# Patient Record
(demographics unavailable — no encounter records)

---

## 2024-10-11 NOTE — RESULTS/DATA
[FreeTextEntry1] : 8/3/21: 1, 2, 3. 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 Suggest correlatio with clinical and other laboratory findings for definitive classification.\par  Pending special stains (reticulin and trichrom)\par  \par  Ancillary studies\par  Special stains: Bone marrow aspirate iron stain: Iron stores are decreased; no ring sideroblasts are seen.\par  Flow cytometry of bone marrow aspirate shows normal myeloid granularity, no increase in myeloid immaturity, and normal\par  myeloid antigen maturation pattern, and the presence of hematogones.\par  \par  Microscopic description:\par  1. Biopsy: Sections of clot and biopsy show hypercellularity (50 to 70%) with mild erythroid predominance, myeloid and erythroid\par  maturation, mild megakaryocytosis with unremarkable morphology (focal clustering), one lymphoid aggregate, and decreased iron stores.\par  \par  \par  \par

## 2024-10-11 NOTE — ASSESSMENT
[With Patient/Caregiver] : With Patient/Caregiver [FreeTextEntry1] : Primary Polycythemia JAK2+/ V 617 mutation HIgh risk given age > 60  -CT Abdomen pelvis 7/7/21 : Liver spleen WNL No RP mesenteric or pelvic lymphadenopathy. Lower lung 2-3 mm nodule - Epo level WNL (ho low end of normal) 10/14/22: WBC 13.0, HGB 15.0, HCT 49.4  11/9/22 WBC 6.3, Hg 13.1, HCT 44.4, plt 581 12/21/22; Hb 14.1, HCt 46 Plt 493 WBC 6.7 2/7/23 Hb 13.5, HCt 44.4 Plt 505 WBC 6.9 3/17/2023 Fe 19, TIBC 383, FeSat 5%, B12 315, FA 8, Ferritin 8 04/05/2023 Hb 13.6 , HCT 45.0,  , WBC 6.0, Cr 1.07 5/1/23 WBC 6.2 Hb 13.9, HCt 45.1 Plt 464 9/13/2023  Fe 14 , TIBC 392 , FeSat 4%, Ferritin 10 11/20/2023: WBC 7.1 Hb 13.8, HCt 44.5 Plt 608 05/06/2024: WBC 7.7 Hb 13.6, HCT 44.3%,   - no Hydrea at this time - Mild elevated PLT count likely 2/2 Iron deficiency Ferritin 8 on 11/20/23 8 on 3/17/23, from 10 on 10/14/22. PLT today at 591 - Continue B12 2000 IU supplement -Mild leukocytosis on 10/14/22 WBC at 13, likely from recent COVID infection. 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 The histological findings in conjunction with a positive JAK2 p.Xpf477Sar mutation (11% VAF) are consistent with myeloproliferative neoplasm. - No symptoms of fatigues/ pruritus, splenomegaly, headaches, lightheadedness, visual disturbances - Continue baby ASA 81 mg daily - Last Phlebotomies: 1/5/23, 6/12/23, 9/20/23, 1/3/24.  s/p phlebotomy on 6/3/24, 8/15/24  Goal for HCT to be <45. HCT 45.6 today, will schedule another phleb now  - elevation of Plt count, potentially 2/2 Iron def anemia Howver also potenital of 2/2 JAk2  Continue to monitor   F/U in 2 months .      [AdvancecareDate] : 07/29/24

## 2024-10-11 NOTE — HISTORY OF PRESENT ILLNESS
[de-identified] :  SHARLENE PAIZ is a 71 year old M with a past medical h/o HLD, BPH was referred to our office per his PMD for polycythemia with Hb of 18.1 / MCV 57.9% with routine labs done at PMD on 4/29/21 \par  \par   Patient was scheduled to have prostate laser and went to PMD for medical clearance\par  \par  Patient shows me lab from prior \par  \par  5/8/20 Hb 17,7, HCt 53.9 plt 296 ANC 3.4 \par  11/5/2020: Hb 18 HCT 53.1 \par  4/28/21: Hb 18.5, HCT 57.9% \par  \par  JAK2 mutation +\par  No mutation was detected in the MPL gene.\par  No insertion or deletion was observed in exon 9 of the calreticulin gene (CALR)\par  \par  Completed COVID19 vaccine series and received booster.  [de-identified] : Patient here for follow up, s/p phlebotomy on 6/3/24, 8/15/24  Patient doing well, offers no acute complaints Denies fever, night sweats, unexplained weight loss, blurry vision, fatigues/ pruritus, splenomegaly, headaches, lightheadedness.

## 2024-10-11 NOTE — HISTORY OF PRESENT ILLNESS
[de-identified] :  SHARLENE PAIZ is a 71 year old M with a past medical h/o HLD, BPH was referred to our office per his PMD for polycythemia with Hb of 18.1 / MCV 57.9% with routine labs done at PMD on 4/29/21 \par  \par   Patient was scheduled to have prostate laser and went to PMD for medical clearance\par  \par  Patient shows me lab from prior \par  \par  5/8/20 Hb 17,7, HCt 53.9 plt 296 ANC 3.4 \par  11/5/2020: Hb 18 HCT 53.1 \par  4/28/21: Hb 18.5, HCT 57.9% \par  \par  JAK2 mutation +\par  No mutation was detected in the MPL gene.\par  No insertion or deletion was observed in exon 9 of the calreticulin gene (CALR)\par  \par  Completed COVID19 vaccine series and received booster.  [de-identified] : Patient here for follow up, s/p phlebotomy on 6/3/24, 8/15/24  Patient doing well, offers no acute complaints Denies fever, night sweats, unexplained weight loss, blurry vision, fatigues/ pruritus, splenomegaly, headaches, lightheadedness.

## 2024-10-11 NOTE — RESULTS/DATA
[FreeTextEntry1] : 8/3/21: 1, 2, 3. 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 Suggest correlatio with clinical and other laboratory findings for definitive classification.\par  Pending special stains (reticulin and trichrom)\par  \par  Ancillary studies\par  Special stains: Bone marrow aspirate iron stain: Iron stores are decreased; no ring sideroblasts are seen.\par  Flow cytometry of bone marrow aspirate shows normal myeloid granularity, no increase in myeloid immaturity, and normal\par  myeloid antigen maturation pattern, and the presence of hematogones.\par  \par  Microscopic description:\par  1. Biopsy: Sections of clot and biopsy show hypercellularity (50 to 70%) with mild erythroid predominance, myeloid and erythroid\par  maturation, mild megakaryocytosis with unremarkable morphology (focal clustering), one lymphoid aggregate, and decreased iron stores.\par  \par  \par  \par

## 2024-10-11 NOTE — ASSESSMENT
[With Patient/Caregiver] : With Patient/Caregiver [FreeTextEntry1] : Primary Polycythemia JAK2+/ V 617 mutation HIgh risk given age > 60  -CT Abdomen pelvis 7/7/21 : Liver spleen WNL No RP mesenteric or pelvic lymphadenopathy. Lower lung 2-3 mm nodule - Epo level WNL (ho low end of normal) 10/14/22: WBC 13.0, HGB 15.0, HCT 49.4  11/9/22 WBC 6.3, Hg 13.1, HCT 44.4, plt 581 12/21/22; Hb 14.1, HCt 46 Plt 493 WBC 6.7 2/7/23 Hb 13.5, HCt 44.4 Plt 505 WBC 6.9 3/17/2023 Fe 19, TIBC 383, FeSat 5%, B12 315, FA 8, Ferritin 8 04/05/2023 Hb 13.6 , HCT 45.0,  , WBC 6.0, Cr 1.07 5/1/23 WBC 6.2 Hb 13.9, HCt 45.1 Plt 464 9/13/2023  Fe 14 , TIBC 392 , FeSat 4%, Ferritin 10 11/20/2023: WBC 7.1 Hb 13.8, HCt 44.5 Plt 608 05/06/2024: WBC 7.7 Hb 13.6, HCT 44.3%,   - no Hydrea at this time - Mild elevated PLT count likely 2/2 Iron deficiency Ferritin 8 on 11/20/23 8 on 3/17/23, from 10 on 10/14/22. PLT today at 591 - Continue B12 2000 IU supplement -Mild leukocytosis on 10/14/22 WBC at 13, likely from recent COVID infection. 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 The histological findings in conjunction with a positive JAK2 p.Cpu460Oiq mutation (11% VAF) are consistent with myeloproliferative neoplasm. - No symptoms of fatigues/ pruritus, splenomegaly, headaches, lightheadedness, visual disturbances - Continue baby ASA 81 mg daily - Last Phlebotomies: 1/5/23, 6/12/23, 9/20/23, 1/3/24.  s/p phlebotomy on 6/3/24, 8/15/24  Goal for HCT to be <45. HCT 45.6 today, will schedule another phleb now  - elevation of Plt count, potentially 2/2 Iron def anemia Howver also potenital of 2/2 JAk2  Continue to monitor   F/U in 2 months .      [AdvancecareDate] : 07/29/24

## 2024-10-11 NOTE — BEGINNING OF VISIT
[PHQ-2 Negative] : PHQ-2 Negative [PHQ-9 Deferred] : PHQ-9 Deferred [Advised Primary Care Follow-up] : Advised Primary Care Follow-up  [Pain Scale: ___] : On a scale of 1-10, today the patient's pain is a(n) [unfilled]. [Never] : Never [Date Discussed (MM/DD/YY): ___] : Discussed: [unfilled] [Abdominal Pain] : no abdominal pain [Vomiting] : no vomiting [Constipation] : no constipation

## 2024-12-10 NOTE — PHYSICAL EXAM
[Normal] : normal rate, regular rhythm, normal S1 and S2 and no murmur heard [No Edema] : there was no peripheral edema [Soft] : abdomen soft [Non Tender] : non-tender [No Rash] : no rash [Normal Gait] : normal gait [Normal Affect] : the affect was normal [Normal Mood] : the mood was normal [Non-distended] : non-distended [Normal Bowel Sounds] : normal bowel sounds [de-identified] : friendly

## 2024-12-10 NOTE — PLAN
[FreeTextEntry1] : HLD - LDL well controlled, continue Simvastatin.  Polycythemia vera - follow up with hematology tomorrow. Last phlebotomy in 8/24. Continue Aspirin 81 mg. Flu shot given today.   RTO 6 months for physical.

## 2024-12-10 NOTE — HISTORY OF PRESENT ILLNESS
[FreeTextEntry1] : HLD, polycythemia vera [de-identified] : Patient comes for routine 6 month visit and fasting labs.  Feels well today, has no complaints. He developed left knee pain 8/24, saw ortho Dr. Medrano and received cortisone shot. Pain now resolved.  Fasting labs performed last week. Scheduled to see hematology tomorrow. Last phlebotomy was in 8/24. He would like flu shot.

## 2024-12-13 NOTE — RESULTS/DATA
[FreeTextEntry1] : 8/3/21: 1, 2, 3. Bone marrow biopsy and clot and bone marrow aspirate\par  - JAK2-positive myeloid neoplasm with decreased iron stores\par  \par  Diagnostic note:\par  The histological findings in conjunction with a positive JAK2 p.[id number] mutation (11% VAF) are consistent with\par  myeloproliferative neoplasm. Suggest correlatio with clinical and other laboratory findings for definitive classification.\par  Pending special stains (reticulin and trichrom)\par  \par  Ancillary studies\par  Special stains: Bone marrow aspirate iron stain: Iron stores are decreased; no ring sideroblasts are seen.\par  Flow cytometry of bone marrow aspirate shows normal myeloid granularity, no increase in myeloid immaturity, and normal\par  myeloid antigen maturation pattern, and the presence of hematogones.\par  \par  Microscopic description:\par  1. Biopsy: Sections of clot and biopsy show hypercellularity (50 to 70%) with mild erythroid predominance, myeloid and erythroid\par  maturation, mild megakaryocytosis with unremarkable morphology (focal clustering), one lymphoid aggregate, and decreased iron stores.\par  \par  \par  \par

## 2024-12-13 NOTE — HISTORY OF PRESENT ILLNESS
[de-identified] :  SHARLENE PAIZ is a 71 year old M with a past medical h/o HLD, BPH was referred to our office per his PMD for polycythemia with Hb of 18.1 / MCV 57.9% with routine labs done at PMD on 4/29/21 \par  \par   Patient was scheduled to have prostate laser and went to PMD for medical clearance\par  \par  Patient shows me lab from prior \par  \par  5/8/20 Hb 17,7, HCt 53.9 plt 296 ANC 3.4 \par  11/5/2020: Hb 18 HCT 53.1 \par  4/28/21: Hb 18.5, HCT 57.9% \par  \par  JAK2 mutation +\par  No mutation was detected in the MPL gene.\par  No insertion or deletion was observed in exon 9 of the calreticulin gene (CALR)\par  \par  Completed COVID19 vaccine series and received booster.  [de-identified] : Patient here for follow up, s/p phlebotomy on 6/3/24, 8/15/24,  Scheduled phleb on 11/11/24, held due to low Hg 12.2/ HCT 40.9  Patient doing well, offers no acute complaints Denies fever, night sweats, unexplained weight loss, blurry vision, fatigues/ pruritus, splenomegaly, headaches, lightheadedness. Today's CBC: WBC 7.5, Hg 12.2 , HCT 43 , plt 558

## 2024-12-13 NOTE — RESULTS/DATA
[FreeTextEntry1] : 8/3/21: 1, 2, 3. Bone marrow biopsy and clot and bone marrow aspirate\par  - JAK2-positive myeloid neoplasm with decreased iron stores\par  \par  Diagnostic note:\par  The histological findings in conjunction with a positive JAK2 p.Ina804Hhl mutation (11% VAF) are consistent with\par  myeloproliferative neoplasm. Suggest correlatio with clinical and other laboratory findings for definitive classification.\par  Pending special stains (reticulin and trichrom)\par  \par  Ancillary studies\par  Special stains: Bone marrow aspirate iron stain: Iron stores are decreased; no ring sideroblasts are seen.\par  Flow cytometry of bone marrow aspirate shows normal myeloid granularity, no increase in myeloid immaturity, and normal\par  myeloid antigen maturation pattern, and the presence of hematogones.\par  \par  Microscopic description:\par  1. Biopsy: Sections of clot and biopsy show hypercellularity (50 to 70%) with mild erythroid predominance, myeloid and erythroid\par  maturation, mild megakaryocytosis with unremarkable morphology (focal clustering), one lymphoid aggregate, and decreased iron stores.\par  \par  \par  \par

## 2024-12-13 NOTE — ASSESSMENT
[With Patient/Caregiver] : With Patient/Caregiver [FreeTextEntry1] : Primary Polycythemia JAK2+/ V 617 mutation HIgh risk given age > 60  -CT Abdomen pelvis 7/7/21 : Liver spleen WNL No RP mesenteric or pelvic lymphadenopathy. Lower lung 2-3 mm nodule - Epo level WNL (ho low end of normal) 10/14/22: WBC 13.0, HGB 15.0, HCT 49.4  11/9/22 WBC 6.3, Hg 13.1, HCT 44.4, plt 581 12/21/22; Hb 14.1, HCt 46 Plt 493 WBC 6.7 2/7/23 Hb 13.5, HCt 44.4 Plt 505 WBC 6.9 3/17/2023 Fe 19, TIBC 383, FeSat 5%, B12 315, FA 8, Ferritin 8 04/05/2023 Hb 13.6 , HCT 45.0,  , WBC 6.0, Cr 1.07 5/1/23 WBC 6.2 Hb 13.9, HCt 45.1 Plt 464 9/13/2023  Fe 14 , TIBC 392 , FeSat 4%, Ferritin 10 11/20/2023: WBC 7.1 Hb 13.8, HCt 44.5 Plt 608 05/06/2024: WBC 7.7 Hb 13.6, HCT 44.3%,   - no Hydrea at this time - Mild elevated PLT count likely 2/2 Iron deficiency Ferritin 8 on 11/20/23 8 on 3/17/23, from 10 on 10/14/22. PLT today at 591 - Continue B12 2000 IU supplement -Mild leukocytosis on 10/14/22 WBC at 13, likely from recent COVID infection. 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 The histological findings in conjunction with a positive JAK2 p.[id number] mutation (11% VAF) are consistent with myeloproliferative neoplasm. - No symptoms of fatigues/ pruritus, splenomegaly, headaches, lightheadedness, visual disturbances - Continue baby ASA 81 mg daily - Last Phlebotomies: 1/5/23, 6/12/23, 9/20/23, 1/3/24.  s/p phlebotomy on 6/3/24, 8/15/24  Goal for HCT to be <45.  -HCT 45.6 10/11/2024,scheduled phleb on 11/11/24, held due to low Hg 12.2/ HCT 40.9  - elevation of Plt count, potentially 2/2 Iron def anemia However also potential of 2/2 JAk2   - Lab only apt in 6 weeks - F/U in 3 months      [AdvancecareDate] : 07/29/24

## 2024-12-13 NOTE — ASSESSMENT
[With Patient/Caregiver] : With Patient/Caregiver [FreeTextEntry1] : Primary Polycythemia JAK2+/ V 617 mutation HIgh risk given age > 60  -CT Abdomen pelvis 7/7/21 : Liver spleen WNL No RP mesenteric or pelvic lymphadenopathy. Lower lung 2-3 mm nodule - Epo level WNL (ho low end of normal) 10/14/22: WBC 13.0, HGB 15.0, HCT 49.4  11/9/22 WBC 6.3, Hg 13.1, HCT 44.4, plt 581 12/21/22; Hb 14.1, HCt 46 Plt 493 WBC 6.7 2/7/23 Hb 13.5, HCt 44.4 Plt 505 WBC 6.9 3/17/2023 Fe 19, TIBC 383, FeSat 5%, B12 315, FA 8, Ferritin 8 04/05/2023 Hb 13.6 , HCT 45.0,  , WBC 6.0, Cr 1.07 5/1/23 WBC 6.2 Hb 13.9, HCt 45.1 Plt 464 9/13/2023  Fe 14 , TIBC 392 , FeSat 4%, Ferritin 10 11/20/2023: WBC 7.1 Hb 13.8, HCt 44.5 Plt 608 05/06/2024: WBC 7.7 Hb 13.6, HCT 44.3%,   - no Hydrea at this time - Mild elevated PLT count likely 2/2 Iron deficiency Ferritin 8 on 11/20/23 8 on 3/17/23, from 10 on 10/14/22. PLT today at 591 - Continue B12 2000 IU supplement -Mild leukocytosis on 10/14/22 WBC at 13, likely from recent COVID infection. 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 The histological findings in conjunction with a positive JAK2 p.Jgg581Nsg mutation (11% VAF) are consistent with myeloproliferative neoplasm. - No symptoms of fatigues/ pruritus, splenomegaly, headaches, lightheadedness, visual disturbances - Continue baby ASA 81 mg daily - Last Phlebotomies: 1/5/23, 6/12/23, 9/20/23, 1/3/24.  s/p phlebotomy on 6/3/24, 8/15/24  Goal for HCT to be <45.  -HCT 45.6 10/11/2024,scheduled phleb on 11/11/24, held due to low Hg 12.2/ HCT 40.9  - elevation of Plt count, potentially 2/2 Iron def anemia However also potential of 2/2 JAk2   - Lab only apt in 6 weeks - F/U in 3 months      [AdvancecareDate] : 07/29/24

## 2024-12-13 NOTE — HISTORY OF PRESENT ILLNESS
[de-identified] :  SHARLENE PAIZ is a 71 year old M with a past medical h/o HLD, BPH was referred to our office per his PMD for polycythemia with Hb of 18.1 / MCV 57.9% with routine labs done at PMD on 4/29/21 \par  \par   Patient was scheduled to have prostate laser and went to PMD for medical clearance\par  \par  Patient shows me lab from prior \par  \par  5/8/20 Hb 17,7, HCt 53.9 plt 296 ANC 3.4 \par  11/5/2020: Hb 18 HCT 53.1 \par  4/28/21: Hb 18.5, HCT 57.9% \par  \par  JAK2 mutation +\par  No mutation was detected in the MPL gene.\par  No insertion or deletion was observed in exon 9 of the calreticulin gene (CALR)\par  \par  Completed COVID19 vaccine series and received booster.  [de-identified] : Patient here for follow up, s/p phlebotomy on 6/3/24, 8/15/24,  Scheduled phleb on 11/11/24, held due to low Hg 12.2/ HCT 40.9  Patient doing well, offers no acute complaints Denies fever, night sweats, unexplained weight loss, blurry vision, fatigues/ pruritus, splenomegaly, headaches, lightheadedness. Today's CBC: WBC 7.5, Hg 12.2 , HCT 43 , plt 558

## 2024-12-13 NOTE — RESULTS/DATA
[FreeTextEntry1] : 8/3/21: 1, 2, 3. Bone marrow biopsy and clot and bone marrow aspirate\par  - JAK2-positive myeloid neoplasm with decreased iron stores\par  \par  Diagnostic note:\par  The histological findings in conjunction with a positive JAK2 p.Uqa542Fnf mutation (11% VAF) are consistent with\par  myeloproliferative neoplasm. Suggest correlatio with clinical and other laboratory findings for definitive classification.\par  Pending special stains (reticulin and trichrom)\par  \par  Ancillary studies\par  Special stains: Bone marrow aspirate iron stain: Iron stores are decreased; no ring sideroblasts are seen.\par  Flow cytometry of bone marrow aspirate shows normal myeloid granularity, no increase in myeloid immaturity, and normal\par  myeloid antigen maturation pattern, and the presence of hematogones.\par  \par  Microscopic description:\par  1. Biopsy: Sections of clot and biopsy show hypercellularity (50 to 70%) with mild erythroid predominance, myeloid and erythroid\par  maturation, mild megakaryocytosis with unremarkable morphology (focal clustering), one lymphoid aggregate, and decreased iron stores.\par  \par  \par  \par   0

## 2024-12-13 NOTE — HISTORY OF PRESENT ILLNESS
[de-identified] :  SHARLENE PAIZ is a 71 year old M with a past medical h/o HLD, BPH was referred to our office per his PMD for polycythemia with Hb of 18.1 / MCV 57.9% with routine labs done at PMD on 4/29/21 \par  \par   Patient was scheduled to have prostate laser and went to PMD for medical clearance\par  \par  Patient shows me lab from prior \par  \par  5/8/20 Hb 17,7, HCt 53.9 plt 296 ANC 3.4 \par  11/5/2020: Hb 18 HCT 53.1 \par  4/28/21: Hb 18.5, HCT 57.9% \par  \par  JAK2 mutation +\par  No mutation was detected in the MPL gene.\par  No insertion or deletion was observed in exon 9 of the calreticulin gene (CALR)\par  \par  Completed COVID19 vaccine series and received booster.  [de-identified] : Patient here for follow up, s/p phlebotomy on 6/3/24, 8/15/24,  Scheduled phleb on 11/11/24, held due to low Hg 12.2/ HCT 40.9  Patient doing well, offers no acute complaints Denies fever, night sweats, unexplained weight loss, blurry vision, fatigues/ pruritus, splenomegaly, headaches, lightheadedness. Today's CBC: WBC 7.5, Hg 12.2 , HCT 43 , plt 558

## 2024-12-13 NOTE — ASSESSMENT
[With Patient/Caregiver] : With Patient/Caregiver [FreeTextEntry1] : Primary Polycythemia JAK2+/ V 617 mutation HIgh risk given age > 60  -CT Abdomen pelvis 7/7/21 : Liver spleen WNL No RP mesenteric or pelvic lymphadenopathy. Lower lung 2-3 mm nodule - Epo level WNL (ho low end of normal) 10/14/22: WBC 13.0, HGB 15.0, HCT 49.4  11/9/22 WBC 6.3, Hg 13.1, HCT 44.4, plt 581 12/21/22; Hb 14.1, HCt 46 Plt 493 WBC 6.7 2/7/23 Hb 13.5, HCt 44.4 Plt 505 WBC 6.9 3/17/2023 Fe 19, TIBC 383, FeSat 5%, B12 315, FA 8, Ferritin 8 04/05/2023 Hb 13.6 , HCT 45.0,  , WBC 6.0, Cr 1.07 5/1/23 WBC 6.2 Hb 13.9, HCt 45.1 Plt 464 9/13/2023  Fe 14 , TIBC 392 , FeSat 4%, Ferritin 10 11/20/2023: WBC 7.1 Hb 13.8, HCt 44.5 Plt 608 05/06/2024: WBC 7.7 Hb 13.6, HCT 44.3%,   - no Hydrea at this time - Mild elevated PLT count likely 2/2 Iron deficiency Ferritin 8 on 11/20/23 8 on 3/17/23, from 10 on 10/14/22. PLT today at 591 - Continue B12 2000 IU supplement -Mild leukocytosis on 10/14/22 WBC at 13, likely from recent COVID infection. 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 The histological findings in conjunction with a positive JAK2 p.Jhp781Fsi mutation (11% VAF) are consistent with myeloproliferative neoplasm. - No symptoms of fatigues/ pruritus, splenomegaly, headaches, lightheadedness, visual disturbances - Continue baby ASA 81 mg daily - Last Phlebotomies: 1/5/23, 6/12/23, 9/20/23, 1/3/24.  s/p phlebotomy on 6/3/24, 8/15/24  Goal for HCT to be <45.  -HCT 45.6 10/11/2024,scheduled phleb on 11/11/24, held due to low Hg 12.2/ HCT 40.9  - elevation of Plt count, potentially 2/2 Iron def anemia However also potential of 2/2 JAk2   - Lab only apt in 6 weeks - F/U in 3 months      [AdvancecareDate] : 07/29/24

## 2025-02-24 NOTE — ASSESSMENT
[FreeTextEntry1] : Primary Polycythemia JAK2+/ V 617 mutation HIgh risk given age > 60  -CT Abdomen pelvis 7/7/21 : Liver spleen WNL No RP mesenteric or pelvic lymphadenopathy. Lower lung 2-3 mm nodule - Epo level WNL (ho low end of normal) 10/14/22: WBC 13.0, HGB 15.0, HCT 49.4  11/9/22 WBC 6.3, Hg 13.1, HCT 44.4, plt 581 12/21/22; Hb 14.1, HCt 46 Plt 493 WBC 6.7 2/7/23 Hb 13.5, HCt 44.4 Plt 505 WBC 6.9 3/17/2023 Fe 19, TIBC 383, FeSat 5%, B12 315, FA 8, Ferritin 8 04/05/2023 Hb 13.6 , HCT 45.0,  , WBC 6.0, Cr 1.07 5/1/23 WBC 6.2 Hb 13.9, HCt 45.1 Plt 464 9/13/2023  Fe 14 , TIBC 392 , FeSat 4%, Ferritin 10 11/20/2023: WBC 7.1 Hb 13.8, HCt 44.5 Plt 608 05/06/2024: WBC 7.7 Hb 13.6, HCT 44.3%,  01/22/25  WBC 7.73 Hb 13.0, HCT 43.8%,  2/24/25: Hb 12.7, HCT 43.8   - no Hydrea at this time - Mild elevated PLT count likely 2/2 Iron deficiency Ferritin 8 on 11/20/23 8 on 3/17/23, from 10 on 10/14/22. PLT today at 591 - Continue B12 2000 IU supplement -Mild leukocytosis on 10/14/22 WBC at 13, likely from recent COVID infection. 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 The histological findings in conjunction with a positive JAK2 p.Naw278Ovt mutation (11% VAF) are consistent with myeloproliferative neoplasm. - No symptoms of fatigues/ pruritus, splenomegaly, headaches, lightheadedness, visual disturbances - Continue baby ASA 81 mg daily - Last Phlebotomies: 1/5/23, 6/12/23, 9/20/23, 1/3/24.  s/p phlebotomy on 6/3/24, 8/15/24  Goal for HCT to be <45.  - HCT 45.6 10/11/2024, scheduled phleb on 11/11/24, held due to low Hg 12.2/ HCT 40.9 - Patient reports he had dental work done a few months ago (Sept 2024?), did not stop bleeding, held asa morning of, in future consider holding aspirin 5 days prior  - Hb 12.7, HCT 43.8 on 2/24/25, will schedule phlebotomy to be done in next 2-3 weeks   - elevation of Plt count, potentially 2/2 Iron def anemia However also potential of 2/2 JAk2   f/u w/ Kayla in 3 months

## 2025-02-24 NOTE — ADDENDUM
[FreeTextEntry1] : Documented by Dejan Reyna acting as scribe for Dr. Smith on 02/24/2025.   All Medical record entries made by the Scribe were at my, Dr. Smith's, direction and personally dictated by me on 02/24/2025. I have reviewed the chart and agree that the record accurately reflects my personal performance of the history, physical exam, assessment and plan. I have also personally directed, reviewed, and agreed with the discharge instructions.

## 2025-02-24 NOTE — HISTORY OF PRESENT ILLNESS
[de-identified] :  SHARLENE PAIZ is a 71 year old M with a past medical h/o HLD, BPH was referred to our office per his PMD for polycythemia with Hb of 18.1 / MCV 57.9% with routine labs done at PMD on 4/29/21 \par  \par   Patient was scheduled to have prostate laser and went to PMD for medical clearance\par  \par  Patient shows me lab from prior \par  \par  5/8/20 Hb 17,7, HCt 53.9 plt 296 ANC 3.4 \par  11/5/2020: Hb 18 HCT 53.1 \par  4/28/21: Hb 18.5, HCT 57.9% \par  \par  JAK2 mutation +\par  No mutation was detected in the MPL gene.\par  No insertion or deletion was observed in exon 9 of the calreticulin gene (CALR)\par  \par  Completed COVID19 vaccine series and received booster.  [de-identified] : Patient here for follow up, s/p phlebotomy on 6/3/24, 8/15/24,  Previously scheduled phleb on 11/11/24, held due to low Hg 12.2/ HCT 40.9  Reports feeling well overall not currently taking iron supplements, currently taking baby asa Had dental work done a few months ago (Sept 2024?), did not stop bleeding, held asa morning of, in future consider holding aspirin 5 days prior   Today's Hb 12.7, HCT 43.8 01/22/25 CBC: WBC 7.73, Hg 12.2 , HCT 43 , plt 558

## 2025-02-24 NOTE — RESULTS/DATA
[FreeTextEntry1] : 8/3/21: 1, 2, 3. Bone marrow biopsy and clot and bone marrow aspirate\par  - JAK2-positive myeloid neoplasm with decreased iron stores\par  \par  Diagnostic note:\par  The histological findings in conjunction with a positive JAK2 p.[id number] mutation (11% VAF) are consistent with\par  myeloproliferative neoplasm. Suggest correlatio with clinical and other laboratory findings for definitive classification.\par  Pending special stains (reticulin and trichrom)\par  \par  Ancillary studies\par  Special stains: Bone marrow aspirate iron stain: Iron stores are decreased; no ring sideroblasts are seen.\par  Flow cytometry of bone marrow aspirate shows normal myeloid granularity, no increase in myeloid immaturity, and normal\par  myeloid antigen maturation pattern, and the presence of hematogones.\par  \par  Microscopic description:\par  1. Biopsy: Sections of clot and biopsy show hypercellularity (50 to 70%) with mild erythroid predominance, myeloid and erythroid\par  maturation, mild megakaryocytosis with unremarkable morphology (focal clustering), one lymphoid aggregate, and decreased iron stores.\par  \par  \par  \par

## 2025-02-24 NOTE — BEGINNING OF VISIT
[PHQ-2 Negative] : PHQ-2 Negative [PHQ-9 Deferred] : PHQ-9 Deferred [Advised Primary Care Follow-up] : Advised Primary Care Follow-up  [Never] : Never [Date Discussed (MM/DD/YY): ___] : Discussed: [unfilled] [With Patient/Caregiver] : with Patient/Caregiver [Pain Scale: ___] : On a scale of 1-10, today the patient's pain is a(n) [unfilled].

## 2025-02-24 NOTE — RESULTS/DATA
[FreeTextEntry1] : 8/3/21: 1, 2, 3. Bone marrow biopsy and clot and bone marrow aspirate\par  - JAK2-positive myeloid neoplasm with decreased iron stores\par  \par  Diagnostic note:\par  The histological findings in conjunction with a positive JAK2 p.[id number] mutation (11% VAF) are consistent with\par  myeloproliferative neoplasm. Suggest correlatio with clinical and other laboratory findings for definitive classification.\par  Pending special stains (reticulin and trichrom)\par  \par  Ancillary studies\par  Special stains: Bone marrow aspirate iron stain: Iron stores are decreased; no ring sideroblasts are seen.\par  Flow cytometry of bone marrow aspirate shows normal myeloid granularity, no increase in myeloid immaturity, and normal\par  myeloid antigen maturation pattern, and the presence of hematogones.\par  \par  Microscopic description:\par  1. Biopsy: Sections of clot and biopsy show hypercellularity (50 to 70%) with mild erythroid predominance, myeloid and erythroid\par  maturation, mild megakaryocytosis with unremarkable morphology (focal clustering), one lymphoid aggregate, and decreased iron stores.\par  \par  \par  \par

## 2025-02-24 NOTE — ASSESSMENT
[FreeTextEntry1] : Primary Polycythemia JAK2+/ V 617 mutation HIgh risk given age > 60  -CT Abdomen pelvis 7/7/21 : Liver spleen WNL No RP mesenteric or pelvic lymphadenopathy. Lower lung 2-3 mm nodule - Epo level WNL (ho low end of normal) 10/14/22: WBC 13.0, HGB 15.0, HCT 49.4  11/9/22 WBC 6.3, Hg 13.1, HCT 44.4, plt 581 12/21/22; Hb 14.1, HCt 46 Plt 493 WBC 6.7 2/7/23 Hb 13.5, HCt 44.4 Plt 505 WBC 6.9 3/17/2023 Fe 19, TIBC 383, FeSat 5%, B12 315, FA 8, Ferritin 8 04/05/2023 Hb 13.6 , HCT 45.0,  , WBC 6.0, Cr 1.07 5/1/23 WBC 6.2 Hb 13.9, HCt 45.1 Plt 464 9/13/2023  Fe 14 , TIBC 392 , FeSat 4%, Ferritin 10 11/20/2023: WBC 7.1 Hb 13.8, HCt 44.5 Plt 608 05/06/2024: WBC 7.7 Hb 13.6, HCT 44.3%,  01/22/25  WBC 7.73 Hb 13.0, HCT 43.8%,  2/24/25: Hb 12.7, HCT 43.8   - no Hydrea at this time - Mild elevated PLT count likely 2/2 Iron deficiency Ferritin 8 on 11/20/23 8 on 3/17/23, from 10 on 10/14/22. PLT today at 591 - Continue B12 2000 IU supplement -Mild leukocytosis on 10/14/22 WBC at 13, likely from recent COVID infection. 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 The histological findings in conjunction with a positive JAK2 p.[id number] mutation (11% VAF) are consistent with myeloproliferative neoplasm. - No symptoms of fatigues/ pruritus, splenomegaly, headaches, lightheadedness, visual disturbances - Continue baby ASA 81 mg daily - Last Phlebotomies: 1/5/23, 6/12/23, 9/20/23, 1/3/24.  s/p phlebotomy on 6/3/24, 8/15/24  Goal for HCT to be <45.  - HCT 45.6 10/11/2024, scheduled phleb on 11/11/24, held due to low Hg 12.2/ HCT 40.9 - Patient reports he had dental work done a few months ago (Sept 2024?), did not stop bleeding, held asa morning of, in future consider holding aspirin 5 days prior  - Hb 12.7, HCT 43.8 on 2/24/25, will schedule phlebotomy to be done in next 2-3 weeks   - elevation of Plt count, potentially 2/2 Iron def anemia However also potential of 2/2 JAk2   f/u w/ Kayla in 3 months

## 2025-02-24 NOTE — HISTORY OF PRESENT ILLNESS
[de-identified] :  SHARLENE PAIZ is a 71 year old M with a past medical h/o HLD, BPH was referred to our office per his PMD for polycythemia with Hb of 18.1 / MCV 57.9% with routine labs done at PMD on 4/29/21 \par  \par   Patient was scheduled to have prostate laser and went to PMD for medical clearance\par  \par  Patient shows me lab from prior \par  \par  5/8/20 Hb 17,7, HCt 53.9 plt 296 ANC 3.4 \par  11/5/2020: Hb 18 HCT 53.1 \par  4/28/21: Hb 18.5, HCT 57.9% \par  \par  JAK2 mutation +\par  No mutation was detected in the MPL gene.\par  No insertion or deletion was observed in exon 9 of the calreticulin gene (CALR)\par  \par  Completed COVID19 vaccine series and received booster.  [de-identified] : Patient here for follow up, s/p phlebotomy on 6/3/24, 8/15/24,  Previously scheduled phleb on 11/11/24, held due to low Hg 12.2/ HCT 40.9  Reports feeling well overall not currently taking iron supplements, currently taking baby asa Had dental work done a few months ago (Sept 2024?), did not stop bleeding, held asa morning of, in future consider holding aspirin 5 days prior   Today's Hb 12.7, HCT 43.8 01/22/25 CBC: WBC 7.73, Hg 12.2 , HCT 43 , plt 558

## 2025-04-28 NOTE — HISTORY OF PRESENT ILLNESS
[FreeTextEntry8] : Patient comes for an acute visit.   He is here for evaluation of weakness in right hand x 1 week. Has difficult grabbing small objects. Weakness usually lasts 10 minutes and resolves on own. Has not had any weakness in past 3 days. He denies other complaints. No headaches or other neurologic complaints.

## 2025-04-28 NOTE — PHYSICAL EXAM
[Normal] : normal gait, coordination grossly intact, no focal deficits and deep tendon reflexes were 2+ and symmetric [de-identified] : right hand - normal  strength [de-identified] : right hand - sensation intact

## 2025-06-03 NOTE — RESULTS/DATA
[FreeTextEntry1] : 8/3/21: 1, 2, 3. Bone marrow biopsy and clot and bone marrow aspirate\par  - JAK2-positive myeloid neoplasm with decreased iron stores\par  \par  Diagnostic note:\par  The histological findings in conjunction with a positive JAK2 p.[id number] mutation (11% VAF) are consistent with\par  myeloproliferative neoplasm. Suggest correlatio with clinical and other laboratory findings for definitive classification.\par  Pending special stains (reticulin and trichrom)\par  \par  Ancillary studies\par  Special stains: Bone marrow aspirate iron stain: Iron stores are decreased; no ring sideroblasts are seen.\par  Flow cytometry of bone marrow aspirate shows normal myeloid granularity, no increase in myeloid immaturity, and normal\par  myeloid antigen maturation pattern, and the presence of hematogones.\par  \par  Microscopic description:\par  1. Biopsy: Sections of clot and biopsy show hypercellularity (50 to 70%) with mild erythroid predominance, myeloid and erythroid\par  maturation, mild megakaryocytosis with unremarkable morphology (focal clustering), one lymphoid aggregate, and decreased iron stores.\par  \par  \par  \par

## 2025-06-03 NOTE — ASSESSMENT
[FreeTextEntry1] : Primary Polycythemia JAK2+/ V 617 mutation HIgh risk given age > 60  -CT Abdomen pelvis 7/7/21 : Liver spleen WNL No RP mesenteric or pelvic lymphadenopathy. Lower lung 2-3 mm nodule - Epo level WNL (ho low end of normal) 10/14/22: WBC 13.0, HGB 15.0, HCT 49.4  11/9/22 WBC 6.3, Hg 13.1, HCT 44.4, plt 581 12/21/22; Hb 14.1, HCt 46 Plt 493 WBC 6.7 2/7/23 Hb 13.5, HCt 44.4 Plt 505 WBC 6.9 3/17/2023 Fe 19, TIBC 383, FeSat 5%, B12 315, FA 8, Ferritin 8 04/05/2023 Hb 13.6 , HCT 45.0,  , WBC 6.0, Cr 1.07 5/1/23 WBC 6.2 Hb 13.9, HCt 45.1 Plt 464 9/13/2023  Fe 14 , TIBC 392 , FeSat 4%, Ferritin 10 11/20/2023: WBC 7.1 Hb 13.8, HCt 44.5 Plt 608 05/06/2024: WBC 7.7 Hb 13.6, HCT 44.3%,  01/22/25  WBC 7.73 Hb 13.0, HCT 43.8%,  2/24/25: Hb 12.7, HCT 43.8   - no Hydrea at this time - Mild elevated PLT count likely 2/2 Iron deficiency Ferritin 8 on 11/20/23 8 on 3/17/23, from 10 on 10/14/22. PLT today at 591 - Continue B12 2000 IU supplement -Mild leukocytosis on 10/14/22 WBC at 13, likely from recent COVID infection. 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 The histological findings in conjunction with a positive JAK2 p.[id number] mutation (11% VAF) are consistent with myeloproliferative neoplasm. - No symptoms of fatigues/ pruritus, splenomegaly, headaches, lightheadedness, visual disturbances - Continue baby ASA 81 mg daily - Last Phlebotomies: 1/5/23, 6/12/23, 9/20/23, 1/3/24.  s/p phlebotomy on 6/3/24, 8/15/24  Goal for HCT to be <45.  - HCT 45.6 10/11/2024, scheduled phleb on 11/11/24, held due to low Hg 12.2/ HCT 40.9 - Patient reports he had dental work done a few months ago (Sept 2024?), did not stop bleeding, held asa morning of, in future consider holding aspirin 5 days prior  -Previously phleb scheduled on 3/12/25, held due to HCT 43.8. Last phleb performed on 8/15/24 - Hb 13.5, HCT 46.4 today, will schedule phlebotomy to be done in next 1-2 weeks   - elevation of Plt count, potentially 2/2 Iron def anemia However also potential of 2/2 JAk2   f/u in 3 months

## 2025-06-03 NOTE — HISTORY OF PRESENT ILLNESS
[de-identified] :  SHARLENE PAIZ is a 71 year old M with a past medical h/o HLD, BPH was referred to our office per his PMD for polycythemia with Hb of 18.1 / MCV 57.9% with routine labs done at PMD on 4/29/21 \par  \par   Patient was scheduled to have prostate laser and went to PMD for medical clearance\par  \par  Patient shows me lab from prior \par  \par  5/8/20 Hb 17,7, HCt 53.9 plt 296 ANC 3.4 \par  11/5/2020: Hb 18 HCT 53.1 \par  4/28/21: Hb 18.5, HCT 57.9% \par  \par  JAK2 mutation +\par  No mutation was detected in the MPL gene.\par  No insertion or deletion was observed in exon 9 of the calreticulin gene (CALR)\par  \par  Completed COVID19 vaccine series and received booster.  [de-identified] : Patient here for follow up, s/p phlebotomy on 6/3/24, 8/15/24,  Previously phleb scheduled on 3/12/25, held due to HCT 43.8 Last phleb 8/15/24  Reports feeling well overall Not currently taking iron supplements, currently taking baby asa Today's CBC: WBC 7.93, Hg 13.5, HCT 46.4, plt 666 01/22/25 CBC: WBC 7.73, Hg 12.2 , HCT 43 , plt 558

## 2025-06-03 NOTE — ASSESSMENT
[FreeTextEntry1] : Primary Polycythemia JAK2+/ V 617 mutation HIgh risk given age > 60  -CT Abdomen pelvis 7/7/21 : Liver spleen WNL No RP mesenteric or pelvic lymphadenopathy. Lower lung 2-3 mm nodule - Epo level WNL (ho low end of normal) 10/14/22: WBC 13.0, HGB 15.0, HCT 49.4  11/9/22 WBC 6.3, Hg 13.1, HCT 44.4, plt 581 12/21/22; Hb 14.1, HCt 46 Plt 493 WBC 6.7 2/7/23 Hb 13.5, HCt 44.4 Plt 505 WBC 6.9 3/17/2023 Fe 19, TIBC 383, FeSat 5%, B12 315, FA 8, Ferritin 8 04/05/2023 Hb 13.6 , HCT 45.0,  , WBC 6.0, Cr 1.07 5/1/23 WBC 6.2 Hb 13.9, HCt 45.1 Plt 464 9/13/2023  Fe 14 , TIBC 392 , FeSat 4%, Ferritin 10 11/20/2023: WBC 7.1 Hb 13.8, HCt 44.5 Plt 608 05/06/2024: WBC 7.7 Hb 13.6, HCT 44.3%,  01/22/25  WBC 7.73 Hb 13.0, HCT 43.8%,  2/24/25: Hb 12.7, HCT 43.8   - no Hydrea at this time - Mild elevated PLT count likely 2/2 Iron deficiency Ferritin 8 on 11/20/23 8 on 3/17/23, from 10 on 10/14/22. PLT today at 591 - Continue B12 2000 IU supplement -Mild leukocytosis on 10/14/22 WBC at 13, likely from recent COVID infection. 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 The histological findings in conjunction with a positive JAK2 p.[id number] mutation (11% VAF) are consistent with myeloproliferative neoplasm. - No symptoms of fatigues/ pruritus, splenomegaly, headaches, lightheadedness, visual disturbances - Continue baby ASA 81 mg daily - Last Phlebotomies: 1/5/23, 6/12/23, 9/20/23, 1/3/24.  s/p phlebotomy on 6/3/24, 8/15/24  Goal for HCT to be <45.  - HCT 45.6 10/11/2024, scheduled phleb on 11/11/24, held due to low Hg 12.2/ HCT 40.9 - Patient reports he had dental work done a few months ago (Sept 2024?), did not stop bleeding, held asa morning of, in future consider holding aspirin 5 days prior  -Previously phleb scheduled on 3/12/25, held due to HCT 43.8. Last phleb performed on 8/15/24 - Hb 13.5, HCT 46.4 today, will schedule phlebotomy to be done in next 1-2 weeks   - elevation of Plt count, potentially 2/2 Iron def anemia However also potential of 2/2 JAk2   f/u in 3 months

## 2025-06-03 NOTE — HISTORY OF PRESENT ILLNESS
[de-identified] :  SHARLENE PAIZ is a 71 year old M with a past medical h/o HLD, BPH was referred to our office per his PMD for polycythemia with Hb of 18.1 / MCV 57.9% with routine labs done at PMD on 4/29/21 \par  \par   Patient was scheduled to have prostate laser and went to PMD for medical clearance\par  \par  Patient shows me lab from prior \par  \par  5/8/20 Hb 17,7, HCt 53.9 plt 296 ANC 3.4 \par  11/5/2020: Hb 18 HCT 53.1 \par  4/28/21: Hb 18.5, HCT 57.9% \par  \par  JAK2 mutation +\par  No mutation was detected in the MPL gene.\par  No insertion or deletion was observed in exon 9 of the calreticulin gene (CALR)\par  \par  Completed COVID19 vaccine series and received booster.  [de-identified] : Patient here for follow up, s/p phlebotomy on 6/3/24, 8/15/24,  Previously phleb scheduled on 3/12/25, held due to HCT 43.8 Last phleb 8/15/24  Reports feeling well overall Not currently taking iron supplements, currently taking baby asa Today's CBC: WBC 7.93, Hg 13.5, HCT 46.4, plt 666 01/22/25 CBC: WBC 7.73, Hg 12.2 , HCT 43 , plt 558

## 2025-06-03 NOTE — RESULTS/DATA
[FreeTextEntry1] : 8/3/21: 1, 2, 3. Bone marrow biopsy and clot and bone marrow aspirate\par  - JAK2-positive myeloid neoplasm with decreased iron stores\par  \par  Diagnostic note:\par  The histological findings in conjunction with a positive JAK2 p.[id number] mutation (11% VAF) are consistent with\par  myeloproliferative neoplasm. Suggest correlatio with clinical and other laboratory findings for definitive classification.\par  Pending special stains (reticulin and trichrom)\par  \par  Ancillary studies\par  Special stains: Bone marrow aspirate iron stain: Iron stores are decreased; no ring sideroblasts are seen.\par  Flow cytometry of bone marrow aspirate shows normal myeloid granularity, no increase in myeloid immaturity, and normal\par  myeloid antigen maturation pattern, and the presence of hematogones.\par  \par  Microscopic description:\par  1. Biopsy: Sections of clot and biopsy show hypercellularity (50 to 70%) with mild erythroid predominance, myeloid and erythroid\par  maturation, mild megakaryocytosis with unremarkable morphology (focal clustering), one lymphoid aggregate, and decreased iron stores.\par  \par  \par  \par

## 2025-06-12 NOTE — HISTORY OF PRESENT ILLNESS
Name band; [FreeTextEntry1] : physical  [de-identified] : Patient comes for an annual exam.  He reports feeling well. Annual labs performed last week. He may soon schedule for left cataract surgery.

## 2025-06-12 NOTE — PHYSICAL EXAM
[No Edema] : there was no peripheral edema [Normal] : soft, non-tender, non-distended, no masses palpated, no HSM and normal bowel sounds [Normal Posterior Cervical Nodes] : no posterior cervical lymphadenopathy [Normal Anterior Cervical Nodes] : no anterior cervical lymphadenopathy [No Rash] : no rash [Normal Gait] : normal gait [Normal Affect] : the affect was normal [Alert and Oriented x3] : oriented to person, place, and time [Normal Mood] : the mood was normal [de-identified] : friendly  [de-identified] : occluded right cerumen, left TM clear [FreeTextEntry1] : defer to GI/ [de-identified] : tanned skin

## 2025-06-12 NOTE — REVIEW OF SYSTEMS
[Hearing Loss] : hearing loss [Joint Pain] : joint pain [Negative] : Heme/Lymph [FreeTextEntry9] : left knee pain - dissipating

## 2025-06-12 NOTE — HISTORY OF PRESENT ILLNESS
[FreeTextEntry1] : physical  [de-identified] : Patient comes for an annual exam.  He reports feeling well. Annual labs performed last week. He may soon schedule for left cataract surgery.

## 2025-06-12 NOTE — PHYSICAL EXAM
[No Edema] : there was no peripheral edema [Normal] : soft, non-tender, non-distended, no masses palpated, no HSM and normal bowel sounds [Normal Posterior Cervical Nodes] : no posterior cervical lymphadenopathy [Normal Anterior Cervical Nodes] : no anterior cervical lymphadenopathy [No Rash] : no rash [Normal Gait] : normal gait [Normal Affect] : the affect was normal [Alert and Oriented x3] : oriented to person, place, and time [Normal Mood] : the mood was normal [de-identified] : friendly  [de-identified] : occluded right cerumen, left TM clear [FreeTextEntry1] : defer to GI/ [de-identified] : tanned skin

## 2025-06-12 NOTE — HEALTH RISK ASSESSMENT
[Very Good] : ~his/her~  mood as very good [No] : In the past 12 months have you used drugs other than those required for medical reasons? No [No falls in past year] : Patient reported no falls in the past year [0] : 2) Feeling down, depressed, or hopeless: Not at all (0) [Never] : Never [Patient reported colonoscopy was abnormal] : Patient reported colonoscopy was abnormal [With Family] : lives with family [Retired] : retired [] :  [# Of Children ___] : has [unfilled] children [Sexually Active] : sexually active [Fully functional (bathing, dressing, toileting, transferring, walking, feeding)] : Fully functional (bathing, dressing, toileting, transferring, walking, feeding) [Fully functional (using the telephone, shopping, preparing meals, housekeeping, doing laundry, using] : Fully functional and needs no help or supervision to perform IADLs (using the telephone, shopping, preparing meals, housekeeping, doing laundry, using transportation, managing medications and managing finances) [Smoke Detector] : smoke detector [Carbon Monoxide Detector] : carbon monoxide detector [Seat Belt] :  uses seat belt [Sunscreen] : uses sunscreen [None] : Patient does not have any barriers to medication adherence [Yes] : Reviewed medication list for presence of high-risk medications. [Benzodiazepines] : benzodiazepines [Opioids] : opioids [Blood Thinners] : blood thinners [Muscle Relaxants] : muscle relaxants [Sedatives] : sedatives [Little interest or pleasure doing things] : 1) Little interest or pleasure doing things [Feeling down, depressed, or hopeless] : 2) Feeling down, depressed, or hopeless [PHQ-2 Negative - No further assessment needed] : PHQ-2 Negative - No further assessment needed [NO] : No [With Patient/Caregiver] : , with patient/caregiver [Reviewed no changes] : Reviewed, no changes [Designated Healthcare Proxy] : Designated healthcare proxy [Name: ___] : Health Care Proxy's Name: [unfilled]  [Relationship: ___] : Relationship: [unfilled] [Aggressive treatment] : aggressive treatment [I will adhere to the patient's wishes.] : I will adhere to the patient's wishes. [[id number]] : 0 [High Risk Behavior] : no high risk behavior [Reports changes in hearing] : Reports no changes in hearing [Reports changes in vision] : Reports no changes in vision [Reports changes in dental health] : Reports no changes in dental health [ColonoscopyDate] : 02/24 [ColonoscopyComments] : 2 polyps - tubular adenoma, repeat 2027; Dr. Burnette  [AdvancecareDate] : 06/25

## 2025-06-12 NOTE — HEALTH RISK ASSESSMENT
[Very Good] : ~his/her~  mood as very good [No] : In the past 12 months have you used drugs other than those required for medical reasons? No [No falls in past year] : Patient reported no falls in the past year [0] : 2) Feeling down, depressed, or hopeless: Not at all (0) [Never] : Never [Patient reported colonoscopy was abnormal] : Patient reported colonoscopy was abnormal [With Family] : lives with family [Retired] : retired [] :  [# Of Children ___] : has [unfilled] children [Sexually Active] : sexually active [Fully functional (bathing, dressing, toileting, transferring, walking, feeding)] : Fully functional (bathing, dressing, toileting, transferring, walking, feeding) [Fully functional (using the telephone, shopping, preparing meals, housekeeping, doing laundry, using] : Fully functional and needs no help or supervision to perform IADLs (using the telephone, shopping, preparing meals, housekeeping, doing laundry, using transportation, managing medications and managing finances) [Smoke Detector] : smoke detector [Carbon Monoxide Detector] : carbon monoxide detector [Seat Belt] :  uses seat belt [Sunscreen] : uses sunscreen [None] : Patient does not have any barriers to medication adherence [Yes] : Reviewed medication list for presence of high-risk medications. [Benzodiazepines] : benzodiazepines [Opioids] : opioids [Blood Thinners] : blood thinners [Muscle Relaxants] : muscle relaxants [Sedatives] : sedatives [Little interest or pleasure doing things] : 1) Little interest or pleasure doing things [Feeling down, depressed, or hopeless] : 2) Feeling down, depressed, or hopeless [PHQ-2 Negative - No further assessment needed] : PHQ-2 Negative - No further assessment needed [NO] : No [With Patient/Caregiver] : , with patient/caregiver [Reviewed no changes] : Reviewed, no changes [Designated Healthcare Proxy] : Designated healthcare proxy [Name: ___] : Health Care Proxy's Name: [unfilled]  [Relationship: ___] : Relationship: [unfilled] [Aggressive treatment] : aggressive treatment [I will adhere to the patient's wishes.] : I will adhere to the patient's wishes. [[id number]] : 0 [High Risk Behavior] : no high risk behavior [Reports changes in hearing] : Reports no changes in hearing [Reports changes in vision] : Reports no changes in vision [Reports changes in dental health] : Reports no changes in dental health [ColonoscopyDate] : 02/24 [ColonoscopyComments] : 2 polyps - tubular adenoma, repeat 2027; Dr. Burnette  [AdvancecareDate] : 06/25

## 2025-06-12 NOTE — PLAN
[FreeTextEntry1] : Lab results reviewed in office with pt. Discussed diet, exercise, and weight maintenance. EKG - RBBB, unchanged. Vaccines UTD. Flu shot in Fall. Colonoscopy 2/24 - repeat 2027. HLD - LDL remains well-controlled, continue Simvastatin. Borderline DM - A1c higher now 5.8. Watch carb intake. BPH - PSA stable. Remains off medication. Followed with urology Dr. Dyer. Polycythemia vera - continue baby aspirin. Phlebotomy scheduled on 6/16. Followed with hematology. Vit D deficiency - level remains low. Continue vitamin D3 2000U daily. Apply Debrox to right ear. Follow up with ENT / audiology. Likely will need hearing aids.  RTO 6 months fasting.